# Patient Record
Sex: MALE | Race: BLACK OR AFRICAN AMERICAN | Employment: FULL TIME | URBAN - METROPOLITAN AREA
[De-identification: names, ages, dates, MRNs, and addresses within clinical notes are randomized per-mention and may not be internally consistent; named-entity substitution may affect disease eponyms.]

---

## 2021-06-01 ENCOUNTER — HOSPITAL ENCOUNTER (EMERGENCY)
Age: 46
Discharge: HOME | End: 2021-06-02
Attending: EMERGENCY MEDICINE | Admitting: EMERGENCY MEDICINE
Payer: COMMERCIAL

## 2021-06-01 ENCOUNTER — APPOINTMENT (OUTPATIENT)
Dept: GENERAL RADIOLOGY | Age: 46
End: 2021-06-01
Attending: EMERGENCY MEDICINE
Payer: COMMERCIAL

## 2021-06-01 VITALS
SYSTOLIC BLOOD PRESSURE: 129 MMHG | OXYGEN SATURATION: 94 % | TEMPERATURE: 98.9 F | RESPIRATION RATE: 18 BRPM | DIASTOLIC BLOOD PRESSURE: 80 MMHG | HEART RATE: 66 BPM

## 2021-06-01 DIAGNOSIS — M54.41 ACUTE RIGHT-SIDED LOW BACK PAIN WITH RIGHT-SIDED SCIATICA: Primary | ICD-10-CM

## 2021-06-01 PROCEDURE — 99283 EMERGENCY DEPT VISIT LOW MDM: CPT

## 2021-06-01 PROCEDURE — 72100 X-RAY EXAM L-S SPINE 2/3 VWS: CPT

## 2021-06-01 PROCEDURE — 96372 THER/PROPH/DIAG INJ SC/IM: CPT

## 2021-06-01 PROCEDURE — 81003 URINALYSIS AUTO W/O SCOPE: CPT

## 2021-06-01 PROCEDURE — 74011250636 HC RX REV CODE- 250/636: Performed by: EMERGENCY MEDICINE

## 2021-06-01 RX ORDER — KETOROLAC TROMETHAMINE 30 MG/ML
30 INJECTION, SOLUTION INTRAMUSCULAR; INTRAVENOUS
Status: COMPLETED | OUTPATIENT
Start: 2021-06-01 | End: 2021-06-01

## 2021-06-01 RX ADMIN — KETOROLAC TROMETHAMINE 30 MG: 30 INJECTION, SOLUTION INTRAMUSCULAR at 23:21

## 2021-06-01 NOTE — LETTER
[address] [address] EMERGENCY DEPT 
[address] [facility] 
Kaleb Lopes [place] [id number] [phone] Work/School Note Date: 6/1/2021 To Whom It May concern: 
 
Amy Matt was seen and treated today in the emergency room by the following provider(s): 
Attending Provider: Darylene Kobs., MD.   
 
Amy Matt may return to work on 06/03/2021. Sincerely, Duarte Beauchamp

## 2021-06-02 LAB
GLUCOSE BLD STRIP.AUTO-MCNC: 85 MG/DL (ref 65–100)
SERVICE CMNT-IMP: NORMAL

## 2021-06-02 PROCEDURE — 82962 GLUCOSE BLOOD TEST: CPT

## 2021-06-02 RX ORDER — METHYLPREDNISOLONE 4 MG/1
TABLET ORAL
Qty: 1 DOSE PACK | Refills: 0 | Status: SHIPPED | OUTPATIENT
Start: 2021-06-02

## 2021-06-02 RX ORDER — METHOCARBAMOL 500 MG/1
500 TABLET, FILM COATED ORAL 3 TIMES DAILY
Qty: 15 TABLET | Refills: 0 | Status: SHIPPED | OUTPATIENT
Start: 2021-06-02 | End: 2021-06-07

## 2021-06-02 NOTE — DISCHARGE INSTRUCTIONS
Take medication as prescribed. Schedule close follow-up with primary care physician. You will need to have glucose rechecked. Return to ED if symptoms worsen or progress in any way.

## 2021-06-02 NOTE — ED PROVIDER NOTES
14-year-old male presents with complaint of right lower back pain radiating down right lower extremity that started around 9 hours ago while working in the yard. States that he bent over and had pain localized to lower back radiating to right lower extremity. Denies history of previous back injury or back surgery. Denies numbness, tingling, weakness, bowel or bladder incontinence, fever, chills, nausea, vomiting, abdominal pain, chest pain, shortness of breath. Denies difficulty ambulating. Rates pain as moderate. States that he took ibuprofen and cyclobenzaprine prior to arrival.    The history is provided by the patient. No  was used. Back Pain   This is a new problem. The current episode started 6 to 12 hours ago. The problem has not changed since onset. The problem occurs constantly. Patient reports not work related injury. The pain is associated with lifting. The pain is present in the lumbar spine. The quality of the pain is described as shooting and cramping. The pain is at a severity of 8/10. The pain is moderate. Pertinent negatives include no chest pain, no fever, no numbness, no weight loss, no headaches, no abdominal pain, no abdominal swelling, no bowel incontinence, no perianal numbness, no bladder incontinence, no dysuria, no pelvic pain, no leg pain, no paresthesias, no paresis, no tingling and no weakness. Treatments tried: Ibuprofen, Cyclobenzaprine  The treatment provided no relief. No past medical history on file. No past surgical history on file. No family history on file.     Social History     Socioeconomic History    Marital status:      Spouse name: Not on file    Number of children: Not on file    Years of education: Not on file    Highest education level: Not on file   Occupational History    Not on file   Tobacco Use    Smoking status: Not on file   Substance and Sexual Activity    Alcohol use: Not on file    Drug use: Not on file    Sexual activity: Not on file   Other Topics Concern    Not on file   Social History Narrative    Not on file     Social Determinants of Health     Financial Resource Strain:     Difficulty of Paying Living Expenses:    Food Insecurity:     Worried About Running Out of Food in the Last Year:     [address] in the Last Year:    Transportation Needs:     Lack of Transportation (Medical):  Lack of Transportation (Non-Medical):    Physical Activity:     Days of Exercise per Week:     Minutes of Exercise per Session:    Stress:     Feeling of Stress :    Social Connections:     Frequency of Communication with Friends and Family:     Frequency of Social Gatherings with Friends and Family:     Attends [employer] Services:     Active Member of Clubs or Organizations:     Attends Club or Organization Meetings:     Marital Status:    Intimate Partner Violence:     Fear of Current or Ex-Partner:     Emotionally Abused:     Physically Abused:     Sexually Abused: ALLERGIES: Patient has no known allergies. Review of Systems   Constitutional: Negative for diaphoresis, fatigue, fever and weight loss. HENT: Negative for congestion and rhinorrhea. Respiratory: Negative for cough and shortness of breath. Cardiovascular: Negative for chest pain. Gastrointestinal: Negative for abdominal pain, bowel incontinence, nausea and vomiting. Genitourinary: Negative for bladder incontinence, dysuria, flank pain, hematuria, pelvic pain, scrotal swelling and testicular pain. Musculoskeletal: Positive for back pain. Negative for gait problem, joint swelling and myalgias. Skin: Negative for rash. Neurological: Negative for dizziness, tingling, weakness, light-headedness, numbness, headaches and paresthesias. Vitals:    06/01/21 2320   BP: 129/80   Pulse: 66   Resp: 18   Temp: 98.9 °F (37.2 °C)   SpO2: 94%            Physical Exam  Vitals and nursing note reviewed.    Constitutional: Appearance: Normal appearance. HENT:      Head: Normocephalic. Nose: Nose normal.      Mouth/Throat:      Mouth: Mucous membranes are moist.   Eyes:      Extraocular Movements: Extraocular movements intact. Pupils: Pupils are equal, round, and reactive to light. Cardiovascular:      Rate and Rhythm: Normal rate. Pulses: Normal pulses. Heart sounds: Normal heart sounds. Pulmonary:      Effort: Pulmonary effort is normal.      Breath sounds: Normal breath sounds. Abdominal:      General: Bowel sounds are normal. There is no distension. Palpations: Abdomen is soft. There is no mass. Tenderness: There is no abdominal tenderness. There is no guarding. Hernia: No hernia is present. Comments: Soft, nontender, nondistended. No rebound or guarding. No CVA tenderness. No pulsatile abdominal mass noted. Musculoskeletal:         General: No swelling, tenderness or deformity. Normal range of motion. Cervical back: Normal range of motion. Comments: No midline T-spine or L-spine tenderness to patient. No step-off. Mild right lumbar paraspinal muscle tenderness. Full range of motion of bilateral lower extremities. + Right leg straight leg raise test.    Skin:     General: Skin is warm. Findings: No erythema or rash. Neurological:      General: No focal deficit present. Mental Status: He is alert and oriented to person, place, and time. Motor: No weakness. Gait: Gait normal.      Deep Tendon Reflexes: Reflexes normal.      Comments: Strength 5 out of 5 throughout. Normal sensory exam.  No saddle anesthesia. Normal DTRs. Normal gait. MDM  Number of Diagnoses or Management Options  Acute right-sided low back pain with right-sided sciatica: new and requires workup  Diagnosis management comments: Patient states that he recently had basic blood work obtained recently and that he had normal kidney function.     States about abrupt onset of symptoms after leaning to lift something earlier today while doing yard work. Normal neuro exam.  No midline L-spine tenderness. Normal DTRs. Normal sensory exam.  No bowel or bladder incontinence. Strength 5/5 throughout. X-ray L-spine ordered. Toradol 30 mg IM given. UA pending.  ==============================================  UA negative for UTI. No significant blood present. XR L spine w/ no acute findings. Will d/c home with Robaxin, Medrol dosepak. Instructed follow-up with PCP and given strict return precautions. Amount and/or Complexity of Data Reviewed  Clinical lab tests: ordered and reviewed  Tests in the radiology section of CPT®: ordered and reviewed  Tests in the medicine section of CPT®: ordered and reviewed  Review and summarize past medical records: yes  Independent visualization of images, tracings, or specimens: yes    Risk of Complications, Morbidity, and/or Mortality  Presenting problems: moderate  Diagnostic procedures: low  Management options: low  General comments: POC glucose 85. Patient Progress  Patient progress: stable    ED Course as of Jun 02 0004 Tue Jun 01, 2021   2339 XR L spine FINDINGS:     Alignment of the lumbar spine and vertebral body heights are maintained. There  is no acute fracture or dislocation. Intervertebral disc spaces are preserved.        IMPRESSION:  1. Negative lumbar spine x-ray. [DF]      ED Course User Index  [DF] Cuong Tamayo MD       Procedures        Umer Talavera MD; 6/2/2021 @11:07 PM Voice dictation software was used during the making of this note. This software is not perfect and grammatical and other typographical errors may be present.   This note has not been proofread for errors.  ===================================================================

## 2021-06-02 NOTE — ED NOTES
I have reviewed discharge instructions with the patient. The patient verbalized understanding. Patient left ED via Discharge Method: ambulatory to Home with  self). Opportunity for questions and clarification provided. Patient given 2 scripts. To continue your aftercare when you leave the hospital, you may receive an automated call from our care team to check in on how you are doing. This is a free service and part of our promise to provide the best care and service to meet your aftercare needs.  If you have questions, or wish to unsubscribe from this service please call [phone]. Thank you for Choosing our [facility] Emergency Department.

## 2021-06-02 NOTE — ED TRIAGE NOTES
Presents to ED with c/o 8/10 right flank pain radiating around to anterior hip that started approx. 9 hours ago while working outside in the yard.  Masked on arrival.

## 2023-01-25 ENCOUNTER — OFFICE VISIT (OUTPATIENT)
Dept: ORTHOPEDIC SURGERY | Age: 48
End: 2023-01-25
Payer: COMMERCIAL

## 2023-01-25 DIAGNOSIS — M21.621 BUNIONETTE OF RIGHT FOOT: ICD-10-CM

## 2023-01-25 DIAGNOSIS — M79.671 RIGHT FOOT PAIN: Primary | ICD-10-CM

## 2023-01-25 PROCEDURE — 99214 OFFICE O/P EST MOD 30 MIN: CPT | Performed by: ORTHOPAEDIC SURGERY

## 2023-01-25 RX ORDER — PIMECROLIMUS 10 MG/G
CREAM TOPICAL 2 TIMES DAILY
COMMUNITY
Start: 2020-06-12

## 2023-01-25 RX ORDER — ATORVASTATIN CALCIUM 20 MG/1
TABLET, FILM COATED ORAL
COMMUNITY

## 2023-01-25 RX ORDER — CETIRIZINE HYDROCHLORIDE 10 MG/1
TABLET ORAL
COMMUNITY

## 2023-01-25 RX ORDER — FLUTICASONE PROPIONATE 50 MCG
SPRAY, SUSPENSION (ML) NASAL
COMMUNITY

## 2023-01-25 RX ORDER — MELOXICAM 15 MG/1
15 TABLET ORAL DAILY
Qty: 30 TABLET | Refills: 3 | Status: SHIPPED | OUTPATIENT
Start: 2023-01-25

## 2023-01-25 NOTE — PROGRESS NOTES
Name: Kamilah Mcgarry  YOB: 1975  Gender: male  MRN: [id number]    Summary:     Right symptomatic type II bunionette     CC: New Patient (Right foot  x-rays taken in office today)       HPI: Kamilah Mcgarry is a 49 y.o. male who presents with New Patient (Right foot  x-rays taken in office today)  . This patient presents to the office today with a history of intermittent lateral sided right foot pain. He is a runner and also works as a  at [email or web address] she is on his foot a lot. History was obtained by Patient     ROS/Meds/PSH/PMH/FH/SH: I personally reviewed the patients standard intake form. Below are the pertinents    Tobacco:  reports that he has never smoked. He has never used smokeless tobacco.  Diabetes: None      Physical Examination:  Exam of the right foot and ankle shows tenderness to palpation at the fifth MTP joint. He does have a mild bunionette deformity. There is no overlying skin breakdown. He has palpable pulses. Imaging:   I independently interpreted XR taken today  Right foot XR: AP, Lateral, Oblique views     ICD-10-CM    1. Right foot pain  M79.671 XR FOOT RIGHT (MIN 3 VIEWS)      2. Bunionette of right foot  M21.621          Report: AP, lateral, oblique x-ray of the right foot demonstrates type II bunionette    Impression: Type II bunionette   Olegario Stanley III, MD           Assessment:   Right symptomatic type II bunionette    Treatment Plan:   4 This is a chronic illness/condition with exacerbation and progression  Treatment at this time: Prescription Drug Management  Studies ordered: NO XR needed @ Next Visit    Weight-bearing status: WBAT        Return to work/work restrictions: none  Mobic 15mg p.o. qday x 14 days: An Rx was given. We discussed the use of Mobic. I advised not to combine it with other NSAIDS such as advil, motrin, nor aleve. I discussed Mobic and its affect on the GI system, its risk of ulcer formation/exacerbation.  I also discussed its affects on the kidneys and risk of nephritis and kidney damage. We discussed how it can alter your blood coagulability and limit platelet function, its negative affect on coronary artery disease, and how excessive alcohol use with Mobic can make all these problems worse. We discussed the use of a bunionette pad for this. He does not make improvements we discussed potential injection into the bursa if necessary.

## 2023-06-01 ENCOUNTER — OFFICE VISIT (OUTPATIENT)
Dept: ORTHOPEDIC SURGERY | Age: 48
End: 2023-06-01
Payer: COMMERCIAL

## 2023-06-01 DIAGNOSIS — M17.12 PRIMARY OSTEOARTHRITIS OF LEFT KNEE: Primary | ICD-10-CM

## 2023-06-01 PROCEDURE — 99213 OFFICE O/P EST LOW 20 MIN: CPT | Performed by: PHYSICIAN ASSISTANT

## 2023-06-01 PROCEDURE — 20610 DRAIN/INJ JOINT/BURSA W/O US: CPT | Performed by: PHYSICIAN ASSISTANT

## 2023-06-01 RX ORDER — METHYLPREDNISOLONE ACETATE 40 MG/ML
80 INJECTION, SUSPENSION INTRA-ARTICULAR; INTRALESIONAL; INTRAMUSCULAR; SOFT TISSUE ONCE
Status: COMPLETED | OUTPATIENT
Start: 2023-06-01 | End: 2023-06-01

## 2023-06-01 RX ADMIN — METHYLPREDNISOLONE ACETATE 80 MG: 40 INJECTION, SUSPENSION INTRA-ARTICULAR; INTRALESIONAL; INTRAMUSCULAR; SOFT TISSUE at 08:29

## 2023-06-01 NOTE — PROGRESS NOTES
posterior lateral aspect of the knee. There is medial joint line opening with valgus stress. Distal motor function, sensation, pulses intact. Xray: No new radiographs indicated    A/Plan:     ICD-10-CM    1. Primary osteoarthritis of left knee  M17.12 methylPREDNISolone acetate (DEPO-MEDROL) injection 80 mg     DRAIN/INJECT LARGE JOINT/BURSA           I discussed with the patient exacerbation of his symptoms. We discussed possibility of small meniscus pathology but he is not complaining of any mechanical symptoms. Also discussed the potential for some early degenerative changes. We reviewed treatment options including oral medication, injection, therapy and advanced imaging. At this time he is amenable to proceeding conservatively with repeat injection so we will forego advanced imaging as this would be helpful for surgical planning and the hope is to try to avoid that. We will see him back on as-needed basis. Procedure note:  After discussion of risks and benefits including, but not limited to pain, infection, steroid flare, fat necrosis, skin discoloration, increased blood sugar, and injury to blood vessels or nerves, the patient verbally consented to proceed with injection of the affected knee. We have discussed and they understand that decision to proceed with injection as part of the overall decision to avoid proceeding with major elective surgery. The Left knee(s) was(were) sterilely prepped in standard fashion and injected with 2 cc of depo medrol(40mg/ml), 2 cc of Naropin (0.5%). The patient tolerated the injection(s) well. The dressing(s) was(were) applied. This was not a planned injection and involved medical decision making for decision to proceed with procedure. Return if symptoms worsen or fail to improve.         Maribel Lewis  06/01/23

## 2024-03-29 ENCOUNTER — APPOINTMENT (OUTPATIENT)
Dept: GENERAL RADIOLOGY | Age: 49
End: 2024-03-29
Payer: COMMERCIAL

## 2024-03-29 ENCOUNTER — HOSPITAL ENCOUNTER (EMERGENCY)
Age: 49
Discharge: HOME | End: 2024-03-29
Payer: COMMERCIAL

## 2024-03-29 VITALS
BODY MASS INDEX: 35.79 KG/M2 | HEART RATE: 69 BPM | HEIGHT: 70 IN | DIASTOLIC BLOOD PRESSURE: 84 MMHG | OXYGEN SATURATION: 97 % | RESPIRATION RATE: 23 BRPM | WEIGHT: 250 LBS | TEMPERATURE: 97.5 F | SYSTOLIC BLOOD PRESSURE: 133 MMHG

## 2024-03-29 DIAGNOSIS — R07.9 CHEST PAIN, UNSPECIFIED TYPE: Primary | ICD-10-CM

## 2024-03-29 LAB
ANION GAP SERPL CALC-SCNC: 4 MMOL/L (ref 2–11)
BASOPHILS # BLD: 0 K/UL (ref 0–0.2)
BASOPHILS NFR BLD: 0 % (ref 0–2)
BUN SERPL-MCNC: 11 MG/DL (ref 6–23)
CALCIUM SERPL-MCNC: 9.1 MG/DL (ref 8.3–10.4)
CHLORIDE SERPL-SCNC: 103 MMOL/L (ref 103–113)
CO2 SERPL-SCNC: 29 MMOL/L (ref 21–32)
CREAT SERPL-MCNC: 1.11 MG/DL (ref 0.8–1.5)
DIFFERENTIAL METHOD BLD: NORMAL
EKG ATRIAL RATE: 69 BPM
EKG DIAGNOSIS: NORMAL
EKG P AXIS: 48 DEGREES
EKG P-R INTERVAL: 163 MS
EKG Q-T INTERVAL: 390 MS
EKG QRS DURATION: 95 MS
EKG QTC CALCULATION (BAZETT): 415 MS
EKG R AXIS: 19 DEGREES
EKG T AXIS: 1 DEGREES
EKG VENTRICULAR RATE: 68 BPM
EOSINOPHIL # BLD: 0.1 K/UL (ref 0–0.8)
EOSINOPHIL NFR BLD: 2 % (ref 0.5–7.8)
ERYTHROCYTE [DISTWIDTH] IN BLOOD BY AUTOMATED COUNT: 13.2 % (ref 11.9–14.6)
GLUCOSE SERPL-MCNC: 148 MG/DL (ref 65–100)
HCT VFR BLD AUTO: 46.6 % (ref 41.1–50.3)
HGB BLD-MCNC: 15.4 G/DL (ref 13.6–17.2)
IMM GRANULOCYTES # BLD AUTO: 0 K/UL (ref 0–0.5)
IMM GRANULOCYTES NFR BLD AUTO: 0 % (ref 0–5)
LYMPHOCYTES # BLD: 2 K/UL (ref 0.5–4.6)
LYMPHOCYTES NFR BLD: 40 % (ref 13–44)
MAGNESIUM SERPL-MCNC: 2.1 MG/DL (ref 1.8–2.4)
MCH RBC QN AUTO: 29.1 PG (ref 26.1–32.9)
MCHC RBC AUTO-ENTMCNC: 33 G/DL (ref 31.4–35)
MCV RBC AUTO: 87.9 FL (ref 82–102)
MONOCYTES # BLD: 0.4 K/UL (ref 0.1–1.3)
MONOCYTES NFR BLD: 8 % (ref 4–12)
NEUTS SEG # BLD: 2.5 K/UL (ref 1.7–8.2)
NEUTS SEG NFR BLD: 49 % (ref 43–78)
NRBC # BLD: 0 K/UL (ref 0–0.2)
PLATELET # BLD AUTO: 236 K/UL (ref 150–450)
PMV BLD AUTO: 9.8 FL (ref 9.4–12.3)
POTASSIUM SERPL-SCNC: 3.6 MMOL/L (ref 3.5–5.1)
RBC # BLD AUTO: 5.3 M/UL (ref 4.23–5.6)
SODIUM SERPL-SCNC: 136 MMOL/L (ref 136–146)
TROPONIN I SERPL HS-MCNC: 5.7 PG/ML (ref 0–14)
TROPONIN I SERPL HS-MCNC: 6.8 PG/ML (ref 0–14)
WBC # BLD AUTO: 5.1 K/UL (ref 4.3–11.1)

## 2024-03-29 PROCEDURE — 85025 COMPLETE CBC W/AUTO DIFF WBC: CPT

## 2024-03-29 PROCEDURE — 93005 ELECTROCARDIOGRAM TRACING: CPT

## 2024-03-29 PROCEDURE — 84484 ASSAY OF TROPONIN QUANT: CPT

## 2024-03-29 PROCEDURE — 93010 ELECTROCARDIOGRAM REPORT: CPT | Performed by: INTERNAL MEDICINE

## 2024-03-29 PROCEDURE — 80048 BASIC METABOLIC PNL TOTAL CA: CPT

## 2024-03-29 PROCEDURE — 94762 N-INVAS EAR/PLS OXIMTRY CONT: CPT

## 2024-03-29 PROCEDURE — 83735 ASSAY OF MAGNESIUM: CPT

## 2024-03-29 PROCEDURE — 99285 EMERGENCY DEPT VISIT HI MDM: CPT

## 2024-03-29 PROCEDURE — 71046 X-RAY EXAM CHEST 2 VIEWS: CPT

## 2024-03-29 PROCEDURE — 6370000000 HC RX 637 (ALT 250 FOR IP)

## 2024-03-29 RX ORDER — ASPIRIN 81 MG/1
324 TABLET, CHEWABLE ORAL ONCE
Status: COMPLETED | OUTPATIENT
Start: 2024-03-29 | End: 2024-03-29

## 2024-03-29 RX ADMIN — ASPIRIN 324 MG: 81 TABLET, CHEWABLE ORAL at 13:55

## 2024-03-29 ASSESSMENT — PAIN - FUNCTIONAL ASSESSMENT: PAIN_FUNCTIONAL_ASSESSMENT: 0-10

## 2024-03-29 ASSESSMENT — PAIN SCALES - GENERAL: PAINLEVEL_OUTOF10: 3

## 2024-03-29 ASSESSMENT — ENCOUNTER SYMPTOMS
ABDOMINAL PAIN: 0
SHORTNESS OF BREATH: 0
NAUSEA: 0
VOMITING: 0
BACK PAIN: 0
COUGH: 0

## 2024-03-29 NOTE — ED PROVIDER NOTES
Emergency Department Provider Note       PCP: Alma Bethea APRN - NP   Age: 49 y.o.   Sex: male     DISPOSITION Decision To Discharge 03/29/2024 04:06:56 PM       ICD-10-CM    1. Chest pain, unspecified type  R07.9 [facility] [place]          Medical Decision Making     This patient is a 49-year-old male with a history of hypertension and type 2 diabetes controlled with diet who presents to the emergency department today with his wife due to substernal chest pain that started approximately 1 hour prior to arrival.  Physical exam of the patient reveals 2+ equal reactive radial pulses bilaterally.  He has no reproducible chest wall tenderness.  His CBC and BMP are within normal limits.  EKG shows normal sinus rhythm at a rate of 68 bpm.  No irregularity noted on EKG today.  Initial and secondary troponins are both within normal limits.  I advised the patient to call his cardiologist back in order to establish a follow-up appointment this coming week.  Strict return precautions were discussed.  Patient and his wife are in agreement with the plan moving forward.    ED Course as of 03/29/24 1612   Fri Mar 29, 2024   1336 ECG  Rate 68 bpm  No st segment elevation  NSR [KS]   1411 Troponin, High Sensitivity: 5.7 [KS]   1411 CXR IMPRESSION:  No acute findings in the chest [KS]   1611 Left arm 133/84 mmHg  Right arm 134/92 mmHg [KS]      ED Course User Index  [KS] Vidal Ruiz PA     1 acute complicated illness or injury.  Shared medical decision making was utilized in creating the patients health plan today.    I independently ordered and reviewed each unique test.       I interpreted the X-rays chest x-ray shows no acute abnormality, in agreement with radiologist interpretation.              History     This patient is a 49-year-old male with a history of hypertension and type 2 diabetes controlled with diet who presents to the emergency department today with his wife due to substernal

## 2024-03-29 NOTE — ED NOTES
Patient mobility status  with no difficulty. Provider aware     I have reviewed discharge instructions with the patient.  The patient verbalized understanding.    Patient left ED via Discharge Method: ambulatory to Home with Spouse.    Opportunity for questions and clarification provided.     Patient given 0 scripts.

## 2024-03-29 NOTE — DISCHARGE INSTRUCTIONS
Please make an appointment with either your cardiologist or with the cardiologist listed above for close reevaluation.  Make an appointment with your primary care doctor in the meantime for follow-up.  If your symptoms change or worsen in any way, please return immediately to the emergency department.

## 2024-03-29 NOTE — ED TRIAGE NOTES
Pt states that about an hour ago at work he started having a sharp midsternal chest pain.  Pt states he took some ibuprofen and the pain has improved somewhat.  Pt states that the pain is not reproducible and does not radiate.

## 2025-04-28 ENCOUNTER — TELEPHONE (OUTPATIENT)
Dept: ORTHOPEDIC SURGERY | Age: 50
End: 2025-04-28

## 2025-04-28 NOTE — TELEPHONE ENCOUNTER
Patient stepped wrong 2 weeks ago and his L foot is giving him pain,  have anything before 5/15, he has been seen for his R foot?

## 2025-04-29 RX ORDER — ONDANSETRON 8 MG/1
TABLET, ORALLY DISINTEGRATING ORAL
COMMUNITY

## 2025-04-29 RX ORDER — ATORVASTATIN CALCIUM 40 MG/1
TABLET, FILM COATED ORAL
COMMUNITY
Start: 2025-03-07

## 2025-04-29 RX ORDER — HYDROCORTISONE 25 MG/G
CREAM TOPICAL
COMMUNITY

## 2025-04-29 RX ORDER — PRAMOXINE HYDROCHLORIDE HYDROCORTISONE ACETATE 100; 100 MG/10G; MG/10G
AEROSOL, FOAM TOPICAL
COMMUNITY

## 2025-04-29 RX ORDER — BETAMETHASONE DIPROPIONATE 0.5 MG/G
CREAM TOPICAL
COMMUNITY
Start: 2025-04-10

## 2025-04-30 ENCOUNTER — OFFICE VISIT (OUTPATIENT)
Dept: ORTHOPEDIC SURGERY | Age: 50
End: 2025-04-30
Payer: COMMERCIAL

## 2025-04-30 DIAGNOSIS — M79.672 LEFT FOOT PAIN: Primary | ICD-10-CM

## 2025-04-30 DIAGNOSIS — S93.622A SPRAIN OF TARSOMETATARSAL LIGAMENT OF LEFT FOOT, INITIAL ENCOUNTER: ICD-10-CM

## 2025-04-30 PROCEDURE — 99214 OFFICE O/P EST MOD 30 MIN: CPT | Performed by: ORTHOPAEDIC SURGERY

## 2025-04-30 NOTE — PROGRESS NOTES
Name: Terrell Ruano  YOB: 1975  Gender: male  MRN: [id number]    Summary:   Left stable Lisfranc injury       CC: Follow-up (NCC left foot stepped wrong 2 weeks ago still having discomfort xrays obtained in office today )       HPI: Terrell Ruano is a 50 y.o. male who presents with Follow-up (NCC left foot stepped wrong 2 weeks ago still having discomfort xrays obtained in office today )  .  This patient presents to the office today with left able Lisfranc injury.    History was obtained by Patient     History of Present Illness  The patient is a 50-year-old male who presents for evaluation of foot pain.    Approximately 2 to 3 weeks ago, he misstepped, resulting in mild discomfort in his foot. No popping sensation was felt at the time of the incident. Pain is localized to the middle of the foot, with no evidence of bruising on the plantar aspect. Concern is expressed regarding the potential severity of the injury, given his occupation that requires prolonged standing.    SOCIAL HISTORY  Marital Status:         ROS/Meds/PSH/PMH/FH/SH: I personally reviewed the patients standard intake form.  Below are the pertinents    Tobacco:  reports that he has never smoked. He has never used smokeless tobacco.  Diabetes: None      Physical Examination:  Exam of the left lower extremity shows some mild tenderness palpation in the midfoot area.  Has good stability no evidence of plantar bruising.      Imaging:   Interpretation of imaging  Left foot XR: AP, Lateral, Oblique views     ICD-10-CM    1. Left foot pain  M79.672 XR FOOT LEFT (MIN 3 VIEWS)      2. Sprain of tarsometatarsal ligament of left foot, initial encounter  S93.622A          Report: AP, lateral, oblique x-ray of the left foot demonstrates no TMT instability or fracture    Impression: No TMT instability or fracture   CLIFTON STEVENSON III, MD           Assessment:   Left able Lisfranc injury  Assessment & Plan  1. Stable Lisfranc